# Patient Record
Sex: FEMALE | Race: WHITE | NOT HISPANIC OR LATINO | Employment: FULL TIME | URBAN - METROPOLITAN AREA
[De-identification: names, ages, dates, MRNs, and addresses within clinical notes are randomized per-mention and may not be internally consistent; named-entity substitution may affect disease eponyms.]

---

## 2022-10-05 ENCOUNTER — TRANSCRIBE ORDERS (OUTPATIENT)
Dept: PULMONOLOGY | Facility: CLINIC | Age: 62
End: 2022-10-05

## 2022-10-05 ENCOUNTER — OFFICE VISIT (OUTPATIENT)
Dept: PULMONOLOGY | Facility: CLINIC | Age: 62
End: 2022-10-05

## 2022-10-05 VITALS
SYSTOLIC BLOOD PRESSURE: 140 MMHG | HEART RATE: 94 BPM | TEMPERATURE: 98.4 F | WEIGHT: 293 LBS | DIASTOLIC BLOOD PRESSURE: 72 MMHG | BODY MASS INDEX: 51.91 KG/M2 | OXYGEN SATURATION: 97 % | HEIGHT: 63 IN

## 2022-10-05 DIAGNOSIS — J45.909 IDIOPATHIC ASTHMA: ICD-10-CM

## 2022-10-05 DIAGNOSIS — Z87.01 HISTORY OF RECENT PNEUMONIA: ICD-10-CM

## 2022-10-05 DIAGNOSIS — E66.8 EXTREME OBESITY: ICD-10-CM

## 2022-10-05 DIAGNOSIS — Z86.16 HISTORY OF COVID-19: ICD-10-CM

## 2022-10-05 DIAGNOSIS — G47.33 OSA (OBSTRUCTIVE SLEEP APNEA): ICD-10-CM

## 2022-10-05 DIAGNOSIS — R06.02 SHORTNESS OF BREATH: Primary | ICD-10-CM

## 2022-10-05 DIAGNOSIS — R06.09 DOE (DYSPNEA ON EXERTION): Primary | ICD-10-CM

## 2022-10-05 PROCEDURE — 99204 OFFICE O/P NEW MOD 45 MIN: CPT | Performed by: INTERNAL MEDICINE

## 2022-10-05 PROCEDURE — 94726 PLETHYSMOGRAPHY LUNG VOLUMES: CPT | Performed by: INTERNAL MEDICINE

## 2022-10-05 PROCEDURE — 94375 RESPIRATORY FLOW VOLUME LOOP: CPT | Performed by: INTERNAL MEDICINE

## 2022-10-05 PROCEDURE — 94729 DIFFUSING CAPACITY: CPT | Performed by: INTERNAL MEDICINE

## 2022-10-05 RX ORDER — METOPROLOL SUCCINATE 25 MG/1
25 TABLET, EXTENDED RELEASE ORAL DAILY
COMMUNITY
Start: 2022-09-21

## 2022-10-05 RX ORDER — PANTOPRAZOLE SODIUM 40 MG/1
TABLET, DELAYED RELEASE ORAL
COMMUNITY
Start: 2013-03-30 | End: 2022-10-05 | Stop reason: ALTCHOICE

## 2022-10-05 RX ORDER — ALBUTEROL SULFATE 90 UG/1
AEROSOL, METERED RESPIRATORY (INHALATION)
COMMUNITY
Start: 2013-02-27

## 2022-10-05 RX ORDER — ONDANSETRON 4 MG/1
TABLET, ORALLY DISINTEGRATING ORAL
COMMUNITY
Start: 2022-07-25 | End: 2022-10-05

## 2022-10-05 RX ORDER — THEOPHYLLINE 300 MG/1
TABLET, EXTENDED RELEASE ORAL
COMMUNITY
Start: 2013-03-22

## 2022-10-05 RX ORDER — DILTIAZEM HYDROCHLORIDE 60 MG/1
TABLET, FILM COATED ORAL
COMMUNITY

## 2022-10-05 RX ORDER — METHIMAZOLE 5 MG/1
10 TABLET ORAL
COMMUNITY

## 2022-10-05 RX ORDER — HYDROCHLOROTHIAZIDE 25 MG/1
TABLET ORAL
COMMUNITY

## 2022-10-05 RX ORDER — IPRATROPIUM BROMIDE AND ALBUTEROL SULFATE 2.5; .5 MG/3ML; MG/3ML
3 SOLUTION RESPIRATORY (INHALATION)
COMMUNITY
Start: 2022-09-26

## 2022-10-05 RX ORDER — NITROGLYCERIN 0.4 MG/1
TABLET SUBLINGUAL
COMMUNITY

## 2022-10-05 NOTE — PROGRESS NOTES
PULMONARY  NOTE    Chief Complaint     History of asthma, obstructive sleep apnea, extreme obesity, history of COVID-19, history of pneumonia and sepsis, dyspnea on exertion    History of Present Illness     62-year-old female referred for chronic respiratory issues    Previously sought Dr. Valdez in our office, last in 2015    She is a lifelong non-smoker  She has a history of asthma  Most recently she has been on Breo, theophylline, and albuterol MDI or DuoNeb    She was recently hospitalized at her local hospital in September 2022  She apparently had sepsis, and exacerbation of asthma, pneumonia versus tracheobronchitis    She feels that she has improved from that hospital stay but is still only about 50-60% improved    She also tested positive for COVID-19 in July 2022    Overall her cough is better  She has not had hemoptysis    She continues to have dyspnea on exertion would have difficulty going up 1 flight of stairs without having to stop due to shortness of breath    She has a history of obstructive sleep apnea and remains on CPAP which she is using on a nightly basis    Patient Active Problem List   Diagnosis   • DENNIS (dyspnea on exertion)   • Extreme obesity (BMI > 50)   • Asthma   • Rcent pneumonia   • History of COVID-19 (7/2022)   • SAHARA (obstructive sleep apnea)     Allergies   Allergen Reactions   • Ace Inhibitors Cough   • Erythromycin Hives   • Sulfa Antibiotics Rash       Current Outpatient Medications:   •  albuterol sulfate  (90 Base) MCG/ACT inhaler, albuterol sulfate HFA 90 mcg/actuation aerosol inhaler  INHALE TWO PUFFS BY MOUTH EVERY 6 HOURS AS NEEDED FOR COUGHING, WHEEZING, AND TROUBLE BREATHING, Disp: , Rfl:   •  dilTIAZem (CARDIZEM) 60 MG tablet, diltiazem 60 mg tablet  TAKE 1 TABLET BY MOUTH THREE TIMES A DAY, Disp: , Rfl:   •  Fluticasone Furoate-Vilanterol (BREO ELLIPTA) 200-25 MCG/INH inhaler, Breo Ellipta 200 mcg-25 mcg/dose powder for inhalation  TAKE 1 PUFF BY MOUTH  "EVERY DAY, Disp: , Rfl:   •  hydroCHLOROthiazide (HYDRODIURIL) 25 MG tablet, hydrochlorothiazide 25 mg tablet  TAKE 1 TABLET BY MOUTH EVERY DAY, Disp: , Rfl:   •  ipratropium-albuterol (DUO-NEB) 0.5-2.5 mg/3 ml nebulizer, Take 3 mL by nebulization 4 (Four) Times a Day., Disp: , Rfl:   •  metFORMIN (GLUCOPHAGE) 500 MG tablet, Take 500 mg by mouth Daily., Disp: , Rfl:   •  methIMAzole (TAPAZOLE) 5 MG tablet, methimazole 5 mg tablet  2 tablets every a.m. Monday through Friday, Disp: , Rfl:   •  metoprolol succinate XL (TOPROL-XL) 25 MG 24 hr tablet, Take 25 mg by mouth Daily., Disp: , Rfl:   •  sertraline (ZOLOFT) 50 MG tablet, sertraline 50 mg tablet  TAKE 1 TABLET BY MOUTH EVERY DAY, Disp: , Rfl:   •  theophylline (THEODUR) 300 MG 12 hr tablet, theophylline  mg tablet,extended release,12 hr  TAKE 1 TABLET BY MOUTH TWICE A DAY, Disp: , Rfl:   •  nitroglycerin (NITROSTAT) 0.4 MG SL tablet, Nitrostat 0.4 mg sublingual tablet  1 po sl prn cp, repeat in 5 min if no imp. To ER if persists or recurs within 24 hrs., Disp: , Rfl:   MEDICATION LIST AND ALLERGIES REVIEWED.    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    Review of Systems  ALSO REFER TO SCANNED ROS SHEET FROM SAME DATE.    /72   Pulse 94   Temp 98.4 °F (36.9 °C)   Ht 160 cm (63\")   Wt 135 kg (297 lb)   SpO2 97%   BMI 52.61 kg/m²   Physical Exam  Vitals and nursing note reviewed.   Constitutional:       General: She is not in acute distress.     Appearance: She is " well-developed. She is not diaphoretic.   HENT:      Head: Normocephalic and atraumatic.   Neck:      Thyroid: No thyromegaly.   Cardiovascular:      Rate and Rhythm: Normal rate and regular rhythm.      Heart sounds: Normal heart sounds. No murmur heard.  Pulmonary:      Effort: Pulmonary effort is normal.      Breath sounds: Normal breath sounds. No stridor.   Chest:   Breasts:      Right: No supraclavicular adenopathy.      Left: No supraclavicular adenopathy.       Abdominal:      General: Bowel sounds are normal.   Musculoskeletal:      Comments: Pitting ankle edema   Lymphadenopathy:      Cervical: No cervical adenopathy.      Upper Body:      Right upper body: No supraclavicular or epitrochlear adenopathy.      Left upper body: No supraclavicular or epitrochlear adenopathy.   Skin:     General: Skin is warm and dry.   Neurological:      Mental Status: She is alert and oriented to person, place, and time.   Psychiatric:         Behavior: Behavior normal.         Results     Chest x-ray reveals no effusions, infiltrates, or consolidation    PFTs reveal no airway obstruction, no restriction, and a reduced diffusion capacity the corrects to normal when adjusted for alveolar volume    Immunization History   Administered Date(s) Administered   • COVID-19 (PFIZER) PURPLE CAP 03/03/2021, 03/31/2021, 10/22/2021     Problem List       ICD-10-CM ICD-9-CM   1. Shortness of breath  R06.02 786.05   2. Asthma  J45.909 493.90   3. Extreme obesity (BMI > 50)  E66.8 278.00   4. History of COVID-19 (7/2022)  Z86.16 V12.09   5. SAHARA (obstructive sleep apnea)  G47.33 327.23   6. Rcent pneumonia  Z87.01 V12.61       Discussion     We reviewed today's test results    She was diagnosed with pneumonia during her hospitalization in September 2022  This appears to have cleared on her current chest x-ray    Likewise, she does not exhibit airway obstruction on current spirometry    I am going to change her from the [facility] to the [facility]  200  Just have her use albuterol as needed during the day and eliminate the ipratropium bromide    I think she would benefit from pulmonary rehabilitation    Have encouraged continued use of CPAP    Obviously weight loss is imperative    We discussed salt and water moderation    I will plan to see her back in a couple of months for follow-up    Moderate level of Medical Decision Making complexity based on 1 undiagnosed new problem, independent interpretation of tests, and/or prescription drug management     Maurilio Pierre MD  Note electronically signed    CC: Bashir Calvillo MD

## 2022-10-31 RX ORDER — FLUTICASONE FUROATE, UMECLIDINIUM BROMIDE AND VILANTEROL TRIFENATATE 200; 62.5; 25 UG/1; UG/1; UG/1
1 POWDER RESPIRATORY (INHALATION) DAILY
Qty: 60 EACH | Refills: 3 | Status: SHIPPED | OUTPATIENT
Start: 2022-10-31 | End: 2023-03-21

## 2022-10-31 NOTE — TELEPHONE ENCOUNTER
Samples of Rx Trelegy 200 were given at last apt on 10/5/22. Pt called requesting for a new prescription needing to be sent to [facility] since she is doing very well on this. Rx Trelegy 200 approved and faxed per chart. Pt verbalized appreciation.

## 2023-03-21 ENCOUNTER — OFFICE VISIT (OUTPATIENT)
Dept: PULMONOLOGY | Facility: CLINIC | Age: 63
End: 2023-03-21
Payer: COMMERCIAL

## 2023-03-21 VITALS
BODY MASS INDEX: 48.41 KG/M2 | TEMPERATURE: 98 F | WEIGHT: 273.2 LBS | HEIGHT: 63 IN | OXYGEN SATURATION: 98 % | SYSTOLIC BLOOD PRESSURE: 158 MMHG | HEART RATE: 93 BPM | DIASTOLIC BLOOD PRESSURE: 78 MMHG

## 2023-03-21 DIAGNOSIS — G47.33 OSA (OBSTRUCTIVE SLEEP APNEA): ICD-10-CM

## 2023-03-21 DIAGNOSIS — Z86.16 HISTORY OF COVID-19: ICD-10-CM

## 2023-03-21 DIAGNOSIS — R06.09 DOE (DYSPNEA ON EXERTION): Primary | ICD-10-CM

## 2023-03-21 PROCEDURE — 99214 OFFICE O/P EST MOD 30 MIN: CPT | Performed by: NURSE PRACTITIONER

## 2023-03-22 NOTE — PROGRESS NOTES
[facility] Follow up    CHIEF COMPLAINT    Dyspnea with exertion    HISTORY OF PRESENT ILLNESS    Brenda Simeon is a 62 y.o.female here today for follow-up.  She was last seen in the office by Dr. Pierre in October.  She denies any respiratory illnesses since her last appointment.    She has had difficulty with worsening dyspnea on exertion due to having COVID in July 2022.    She does continue to use her Breo daily and her theophylline.  She also uses DuoNebs or albuterol as needed for shortness of breath.    She denies any sputum production or hemoptysis.  She denies any chest pain or palpitations.  She denies any lower extremity edema or calf tenderness.    She states that she is active during the day but does not do any additional exercise.    She continues to wear her CPAP every night for SAHARA.  She denies any sleeping difficulties.    She is a lifetime non-smoker.  Patient Active Problem List   Diagnosis   • DENNIS (dyspnea on exertion)   • Extreme obesity (BMI > 50)   • Asthma   • Rcent pneumonia   • History of COVID-19 (7/2022)   • SAHARA (obstructive sleep apnea)       Allergies   Allergen Reactions   • Ace Inhibitors Cough   • Erythromycin Hives   • Sulfa Antibiotics Rash       Current Outpatient Medications:   •  albuterol sulfate  (90 Base) MCG/ACT inhaler, albuterol sulfate HFA 90 mcg/actuation aerosol inhaler  INHALE TWO PUFFS BY MOUTH EVERY 6 HOURS AS NEEDED FOR COUGHING, WHEEZING, AND TROUBLE BREATHING, Disp: , Rfl:   •  Fluticasone Furoate-Vilanterol (BREO ELLIPTA) 200-25 MCG/INH inhaler, Breo Ellipta 200 mcg-25 mcg/dose powder for inhalation  TAKE 1 PUFF BY MOUTH EVERY DAY, Disp: , Rfl:   •  hydroCHLOROthiazide (HYDRODIURIL) 25 MG tablet, hydrochlorothiazide 25 mg tablet  TAKE 1 TABLET BY MOUTH EVERY DAY, Disp: , Rfl:   •  ipratropium-albuterol (DUO-NEB) 0.5-2.5 mg/3 ml nebulizer, Take 3 mL by nebulization 4 (Four) Times a Day., Disp: , Rfl:   •  methIMAzole (TAPAZOLE) 5 MG tablet, 2  tablets., Disp: , Rfl:   •  metoprolol succinate XL (TOPROL-XL) 25 MG 24 hr tablet, Take 1 tablet by mouth Daily., Disp: , Rfl:   •  sertraline (ZOLOFT) 50 MG tablet, sertraline 50 mg tablet  TAKE 1 TABLET BY MOUTH EVERY DAY, Disp: , Rfl:   •  theophylline (THEODUR) 300 MG 12 hr tablet, theophylline  mg tablet,extended release,12 hr  TAKE 1 TABLET BY MOUTH TWICE A DAY, Disp: , Rfl:   •  dilTIAZem (CARDIZEM) 60 MG tablet, diltiazem 60 mg tablet  TAKE 1 TABLET BY MOUTH THREE TIMES A DAY, Disp: , Rfl:   •  nitroglycerin (NITROSTAT) 0.4 MG SL tablet, Nitrostat 0.4 mg sublingual tablet  1 po sl prn cp, repeat in 5 min if no imp. To ER if persists or recurs within 24 hrs. (Patient not taking: Reported on 3/21/2023), Disp: , Rfl:   MEDICATION LIST AND ALLERGIES REVIEWED.    Social History     Tobacco Use   • Smoking status: Never   • Smokeless tobacco: Never   Substance Use Topics   • Alcohol use: Never   • Drug use: Never       FAMILY AND SOCIAL HISTORY REVIEWED.    Review of Systems   Constitutional: Positive for fatigue. Negative for activity change, appetite change, fever and unexpected weight change.   HENT: Negative for congestion, postnasal drip, rhinorrhea, sinus pressure, sore throat and voice change.    Eyes: Negative for visual disturbance.   Respiratory: Positive for shortness of breath. Negative for cough, chest tightness and wheezing.    Cardiovascular: Negative for chest pain, palpitations and leg swelling.   Gastrointestinal: Negative for abdominal distention, abdominal pain, nausea and vomiting.   Endocrine: Negative for cold intolerance and heat intolerance.   Genitourinary: Negative for difficulty urinating and urgency.   Musculoskeletal: Negative for arthralgias, back pain and neck pain.   Skin: Negative for color change and pallor.   Allergic/Immunologic: Negative for environmental allergies and food allergies.   Neurological: Negative for dizziness, syncope, weakness and light-headedness.  "  Hematological: Negative for adenopathy. Does not bruise/bleed easily.   Psychiatric/Behavioral: Negative for agitation and behavioral problems.   .    /78 (BP Location: Left arm, Patient Position: Sitting, Cuff Size: Adult)   Pulse 93   Temp 98 °F (36.7 °C) (Infrared)   Ht 160 cm (62.99\")   Wt 124 kg (273 lb 3.2 oz)   SpO2 98% Comment: resting room air  BMI 48.41 kg/m²     Immunization History   Administered Date(s) Administered   • COVID-19 (MODERNA) BIVALENT BOOSTER 12+YRS 09/21/2022   • COVID-19 (PFIZER) PURPLE CAP 03/03/2021, 03/31/2021, 10/22/2021   • FluLaval/Fluzone >6mos 09/20/2021, 09/15/2022   • Fluzone High Dose =>65 Years ([employer] ONLY) 10/01/2016   • Fluzone Quad >6mos (Multi-dose) 10/01/2018, 10/01/2019, 09/14/2020   • Pneumococcal Conjugate 20-Valent (PCV20) 11/17/2022   • Pneumococcal Polysaccharide (PPSV23) 01/24/2017       Physical Exam  Vitals and nursing note reviewed.   Constitutional:       Appearance: She is well-developed. She is not diaphoretic.   HENT:      Head: Normocephalic and atraumatic.   Eyes:      Pupils: Pupils are equal, round, and reactive to light.   Neck:      Thyroid: No thyromegaly.   Cardiovascular:      Rate and Rhythm: Normal rate and regular rhythm.      Heart sounds: Normal heart sounds. No murmur heard.    No friction rub. No gallop.   Pulmonary:      Effort: Pulmonary effort is normal. No respiratory distress.      Breath sounds: Normal breath sounds. No wheezing or rales.   Chest:      Chest wall: No tenderness.   Abdominal:      General: Bowel sounds are normal.      Palpations: Abdomen is soft.      Tenderness: There is no abdominal tenderness.   Musculoskeletal:         General: No swelling. Normal range of motion.      Cervical back: Normal range of motion and neck supple.   Lymphadenopathy:      Cervical: No cervical adenopathy.   Skin:     General: Skin is warm and dry.      Capillary Refill: Capillary refill takes less than 2 seconds. "   Neurological:      Mental Status: She is alert and oriented to person, place, and time.   Psychiatric:         Mood and Affect: Mood normal.         Behavior: Behavior normal.           RESULTS    PROBLEM LIST    Problem List Items Addressed This Visit        Cardiac and Vasculature    DENNIS (dyspnea on exertion) - Primary       Sleep    SAHARA (obstructive sleep apnea)       Other    History of COVID-19 (7/2022)         DISCUSSION    Ms. Simeon was here for follow-up of her dyspnea.  She seems to doing fairly well from a pulmonary standpoint.  She will continue her Breo daily.  I did encourage her to use the DuoNebs or albuterol as needed for shortness of breath.    She also takes theophylline for her asthma.    She will continue to use her CPAP at night for SAHARA.  We did discuss good sleep regimen such as laying in the lateral position, avoiding alcohol or sedatives prior to bedtime, regular exercise and weight loss.    She will follow-up in 6 months or sooner if her symptoms worsen.  She will need full PFTs at her next appointment.    I personally spent a total of 31 minutes on patient visit today including chart review, face to face with the patient obtaining the history and physical exam, review of pertinent images and tests, counseling and discussion and/or coordination of care as described above, and documentation.  Total time excludes time spent on other separate services such as performing procedures or test interpretation, if applicable.        Denise Brito, APRN  03/21/202309:24 EDT  Electronically signed     Please note that portions of this note were completed with a voice recognition program.        CC: Bashir Calvillo MD

## 2023-06-16 PROBLEM — U09.9 POST-COVID SYNDROME: Status: ACTIVE | Noted: 2023-06-16
